# Patient Record
(demographics unavailable — no encounter records)

---

## 2018-12-17 NOTE — EDPHY
H & P


Stated Complaint: R SHOULDER DISLOCATION


Source: Patient, EMS


Exam Limitations: No limitations





- Personal History


Current Tetanus/Diphtheria Vaccine: Yes





- Medical/Surgical History


Hx Asthma: No


Hx Chronic Respiratory Disease: No


Hx Diabetes: No


Hx Cardiac Disease: No


Hx Renal Disease: No


Hx Cirrhosis: No


Hx Alcoholism: No


Hx HIV/AIDS: No


Hx Splenectomy or Spleen Trauma: No


Other PMH: denies





- Social History


Smoking Status: Light smoker


Time Seen by Provider: 12/17/18 14:35


HPI/ROS: 


HPI:  This is a 35-year-old male who presents with 





Chief Complaint:  Right shoulder injury and suspected dislocation





Location:  Right shoulder


Quality:  Injury


Duration:  2 hr prior to arrival


Signs and Symptoms:  No bleeding, no radiation, no numbness, no weakness, no 

tingling, no incontinence, + decreased range of motion, no swelling, + pain, no 

fever


Timing:  Acute


Severity:  10/10


Context:  Patient is right-hand dominant, presents via EMS from [facility] with a right shoulder injury approximately 2 hr prior to arrival and 

suspected dislocation.  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 

when he planted his hands, he felt immediate, constant pain with obvious 

deformity in his right shoulder area.  He was unable to raise his right arm up 

to his heart level.  EMS gave patient 200 mcg of fentanyl during transit.  

Denies LOC/head injury/neck pain/dizziness/nausea/vomiting/amnesia.  Patient 

reports that he has a history of shoulder surgery in 2009 on the right.


Modifying Factors:  See above





Comment: 








ROS:  A comprehensive 10 system review of systems is otherwise negative aside 

from elements mentioned in the history of present illness. 








MEDICAL/SURGICAL/SOCIAL HISTORY: 


Medical history:  Generally healthy.  Does not take any regular medications.


Surgical history:  Right shoulder surgery 2009


Social history:  Light smoker.  Denies drug use.








CONSTITUTIONAL:  Polite and cooperative adult male, awake and alert, no obvious 

distress


HEENT: Atraumatic and normocephalic.


NECK: supple, no midline tenderness, flexion 45 degrees, extension 45 degrees, 

right and left lateral flexion 45 degrees. No meningismus.


Cardiovascular: Normal S1/S2, regular rate, regular rhythm, without murmur rub 

or gallop.


PULMONARY/CHEST:  Symmetrical and nontender. no crepitus. Clear to auscultation 

bilaterally. Good air movement. No accessory muscle usage.


ABDOMEN:  Soft, nondistended, nontender, no ecchymosis.


EXTREMITIES:  2/2 pulses, strength 5/5, right SHOULDER:  Right arm held in 

abduction with shoulder lacking normal rounded contour.  Difficulty touching 

ipsilateral arm to contralateral shoulder. no Tenderness to palpation over AC 

joint.  Right ELBOW:  Full extension to 180, flexion to 150, no tenderness 

over medial epicondyle, no tenderness over lateral epicondyle, no effusion.  DIP

/PIP/MCP flexion/extension intact with good light touch sensation. no 

deformities, no clubbing, no cyanosis or edema.


NEUROLOGICAL: no focal neuro deficits.  GCS 15.  Light touch sensation intact.


SKIN: Warm and dry, no erythema. no rash.  Good capillary refill.   


 (Pia Santos)


Constitutional: 





 Initial Vital Signs











Temperature (C)  36.9 C   12/17/18 14:29


 


Heart Rate  73   12/17/18 14:29


 


Respiratory Rate  16   12/17/18 14:29


 


Blood Pressure  146/97 H  12/17/18 14:29


 


O2 Sat (%)  96   12/17/18 14:29








 











O2 Delivery Mode               Room Air














Allergies/Adverse Reactions: 


 





No Known Allergies Allergy (Verified 02/23/16 15:44)


 








Home Medications: 














 Medication  Instructions  Recorded


 


Hydrocodone/APAP 5/325 [Norco 1 - 2 tab PO Q4H PRN #10 tab 02/23/16





5/325]  














Medical Decision Making


Procedures: 


Procedure:  Dislocation reduction.





The dislocation of the right shoulder was reduced using Kocher technique 

without complications.  Post reduction the patient's neurovascular exam is 

normal.


Post reduction x-ray demonstrates reduction of the joint to the anatomic 

position.


The procedure was performed by myself.








Procedure:  Splint placement.





A right sling was applied.  After application of the splint I returned and re-

examined the patient.  The splint was adequately immobilizing the joint and 

distal to the splint the patient's circulation and sensation was intact.


 (Pia Santos)


ED Course/Re-evaluation: 


Vital signs reviewed and stable upon arrival.


Patient had adequate pain control therefore was reduced with 1st attempt at 

bedside.


Placed in sling and Post reduction x-ray ordered showing normal anatomic 

alignment and no fracture





No signs of neurovascular compromise/tenting of skin/compartment syndrome/

extremities and joints examined above and below area of concern and are 

neurovascularly intact.








This patient was seen under the supervision of my secondary supervising 

physician.  I evaluated care for this patient independently.  Discussed this 

patient with Dr. Ross who did not see the patient.   


 (Pia Santos)





I did not see this patient while he was in the emergency department.  However 

his care was discussed with the PA while the patient was in the department.  I 

agree with treatment plan and management (CodyJustus)


Differential Diagnosis: 


Differential diagnosis includes but is not limited to shoulder dislocation, 

clavicle fracture, humerus fracture, scapular fracture, acromial clavicular 

injury, glenohumeral instability, rotator cuff tear


 (Pia Santos)





Departure





- Departure


Disposition: Home, Routine, Self-Care


Clinical Impression: 


 Dislocation of shoulder, right, closed





Condition: Good


Instructions:  Shoulder Dislocation (ED), How to Use a Sling (ED)


Additional Instructions: 


Wear the sling while out of bed until seen by Orthopedics.


Take Tylenol 650 mg every 4 hours and/or Ibuprofen 600 mg every 8 hours with 

food as needed for pain. 


Apply ice for 30 minutes at a time; 2-3 times per day for the next 1-2 days. 


Follow up with Orthopedics in 5-7 days at which time they will evaluate and 

recommend with you if conservative management versus further intervention is 

indicated. 


The x-rays obtained in the emergency department today demonstrate no evidence 

of an obvious fracture. 








Follow-Up:  


   Please follow-up as noted above.  Follow up sooner if your condition worsens 

or if you develop any new problems  Call as soon as possible for an 

appointment.  Be clear when you call for an appointment that this is an 

Emergency Department follow-up.  Contact the Emergency Department if you are 

having trouble arranging follow up care.


   Our referrals are not based on your insurance network.  When time allows, 

contact your insurance carrier to verify the referral physician is in your 

plan.  If not, get a referral for an in-.


   Please ask us if you have any questions.


Referrals: 


Jean Perez MD [Medical Doctor] - As per Instructions